# Patient Record
(demographics unavailable — no encounter records)

---

## 2024-11-05 NOTE — IMAGING
[de-identified] :  LEFT KNEE Inspection:  mild effusion Palpation: medial joint line tenderness  Knee Range of Motion:  0-130  Strength: 5/5 Quadriceps strength, 5/5 Hamstring strength Neurological: light touch is intact throughout Ligament Stability and Special Tests:  McMurrays: Positive Lachman: neg Pivot Shift: neg Posterior Drawer: neg Valgus: neg Varus: neg Patella Apprehension: neg Patella Maltracking: neg

## 2024-11-05 NOTE — HISTORY OF PRESENT ILLNESS
[de-identified] : 33 year old male  (, [employer]  )   Left knee pain since 10/20/24 while playing [employer] and felt a 'pop' at knee when it tiwsted The pain is located  medial The pain is associated with  clickng, catching, locking Worse with activity and better at rest. Has tried act mod.  icing, Advil

## 2024-11-05 NOTE — ASSESSMENT
[FreeTextEntry1] : Left X-Ray Examination of the KNEE (4 views): there are no fractures, subluxations or dislocations.   acute complicated injury with knee internal derangement likely meniscus tear requiring surgery   Due to the patients mechanical symptoms along with medial joint line pain, effusion, and pos jelena test on exam we will get an mri to eval for medial meniscus tear   - We discussed their diagnosis and treatment options at length including the risks and benefits of both surgical and non-surgical options. Surgical risks include but are not limited to pain, infection, bleeding, vascular injury, numbness, tingling, nerve damage. - The patient was advised to apply ice (wrapped in a towel or protective covering) to the area daily (20 minutes at a time, 2-4X/day). - naprosyn rx - Patient was given a prescription for an anti-inflammatory medication.  They will take it for the next week and then on an as needed basis, as long as there are no medical contra-indications.  Patient is counseled on possible GI, renal, and cardiovascular side effects. - The patient was advised to modify their activities. - f/u after mri to further discuss surgery            
98.8

## 2024-11-14 NOTE — IMAGING
[de-identified] :  LEFT KNEE Inspection:  mild effusion Palpation: medial joint line tenderness  Knee Range of Motion:  0-130  Strength: 5/5 Quadriceps strength, 5/5 Hamstring strength Neurological: light touch is intact throughout Ligament Stability and Special Tests:  McMurrays: Positive Lachman: neg Pivot Shift: neg Posterior Drawer: neg Valgus: neg Varus: neg Patella Apprehension: neg Patella Maltracking: neg

## 2024-11-14 NOTE — DISCUSSION/SUMMARY
[de-identified] : The patient was advised of the diagnosis.  The natural history of the pathology was explained in full to the patient in layman's terms.  Several different treatment options were discussed and explained to the patient including the risks and benefits of both surgical and non-surgical treatments.  The risks, benefits, and alternatives to surgical arthroscopy of the left knee with medial meniscus repair vs. partial meniscectomy, synovectomy, and cyst excision were reviewed with the patient.  Specifically, I reviewed with the patient that any anterior knee pain may paradoxically worsen for the first six weeks following arthroscopy due to quadriceps weakness. Further, I reviewed with the patient that while arthroscopic treatment typically provides substantial relief of the symptoms (posteromedial or posterolateral joint line pain and mechanical symptoms) related to meniscus tears, arthroscopic treatments typically have very minimal relief of symptoms (anterior knee pain) related to chondromalacia patella or early osteoarthritis.  The risk of recurrent tears as well as progression of occult or underlying arthritis, avascular necrosis, and / or chondrolysis were discussed as well. The patient clearly communicated that these issues were understood.    We also discussed that the risks of surgery include but are not limited to pain, infection (superficial or deep), bleeding, vascular injury, numbness, tingling, nerve damage (direct or indirect), scarring, wound breakdown, failure to resolve symptoms, symptom recurrence, the need for further surgery as well as medical complications such as blood clots, pulmonary embolism, heart attack, stroke, and other anesthesia complications including even death.  The patient clearly communicated that these risks were understood and wished to proceed. This will be scheduled accordingly.

## 2024-11-14 NOTE — HISTORY OF PRESENT ILLNESS
[de-identified] : 33 year old male  (, ultimate jo[employer]  )   Left knee pain since 10/20/24 while playing [employer] and felt a 'pop' at knee when it tiwsted The pain is located  medial The pain is associated with  clickng, catching, locking Worse with activity and better at rest. Has tried act mod.  icing, Advil  11/14/24 - had mri showing MMT, cont pain and [id number], using naproyn,

## 2024-11-14 NOTE — ASSESSMENT
[FreeTextEntry1] :  mri left knee ZP 11/12/24 - MMT (undersurface and some radil component), synov, med parameniscal cyst 1cm   - We discussed their diagnosis and treatment options at length including the risks and benefits of both surgical and non-surgical options. Surgical risks include but are not limited to pain, infection, bleeding, vascular injury, numbness, tingling, nerve damage. - Given their active lifestyle along with pain and mechanical symptoms they are a surgical candidate. - The risks, benefits, and alternatives to left knee MMR vs PMM, synov, cyst excision were discussed with the patient, all questions were answered.

## 2024-11-14 NOTE — IMAGING
[de-identified] :  LEFT KNEE Inspection:  mild effusion Palpation: medial joint line tenderness  Knee Range of Motion:  0-130  Strength: 5/5 Quadriceps strength, 5/5 Hamstring strength Neurological: light touch is intact throughout Ligament Stability and Special Tests:  McMurrays: Positive Lachman: neg Pivot Shift: neg Posterior Drawer: neg Valgus: neg Varus: neg Patella Apprehension: neg Patella Maltracking: neg

## 2024-11-14 NOTE — DISCUSSION/SUMMARY
[de-identified] : The patient was advised of the diagnosis.  The natural history of the pathology was explained in full to the patient in layman's terms.  Several different treatment options were discussed and explained to the patient including the risks and benefits of both surgical and non-surgical treatments.  The risks, benefits, and alternatives to surgical arthroscopy of the left knee with medial meniscus repair vs. partial meniscectomy, synovectomy, and cyst excision were reviewed with the patient.  Specifically, I reviewed with the patient that any anterior knee pain may paradoxically worsen for the first six weeks following arthroscopy due to quadriceps weakness. Further, I reviewed with the patient that while arthroscopic treatment typically provides substantial relief of the symptoms (posteromedial or posterolateral joint line pain and mechanical symptoms) related to meniscus tears, arthroscopic treatments typically have very minimal relief of symptoms (anterior knee pain) related to chondromalacia patella or early osteoarthritis.  The risk of recurrent tears as well as progression of occult or underlying arthritis, avascular necrosis, and / or chondrolysis were discussed as well. The patient clearly communicated that these issues were understood.    We also discussed that the risks of surgery include but are not limited to pain, infection (superficial or deep), bleeding, vascular injury, numbness, tingling, nerve damage (direct or indirect), scarring, wound breakdown, failure to resolve symptoms, symptom recurrence, the need for further surgery as well as medical complications such as blood clots, pulmonary embolism, heart attack, stroke, and other anesthesia complications including even death.  The patient clearly communicated that these risks were understood and wished to proceed. This will be scheduled accordingly.

## 2024-11-14 NOTE — HISTORY OF PRESENT ILLNESS
[de-identified] : 33 year old male  (, [employer]  )   Left knee pain since 10/20/24 while playing [employer] and felt a 'pop' at knee when it tiwsted The pain is located  medial The pain is associated with  clickng, catching, locking Worse with activity and better at rest. Has tried act mod.  icing, Advil  11/14/24 - had mri showing MMT, cont pain and [id number], using naproyn,

## 2024-11-26 NOTE — IMAGING
[de-identified] :   LEFT KNEE Inspection:  incisions c/d/i,  mod effusion  Palpation: no ttp Knee Range of Motion:  0-60 degrees Strength: 4/5 Quadriceps strength, 4+/5 Hamstring strength Neurological: light touch is intact throughout Ligament Stability and Special Tests:  McMurrays: neg Lachman: neg Pivot Shift: neg Posterior Drawer: neg Valgus: neg Varus: neg Patella Apprehension: neg Patella Maltracking: neg

## 2024-11-26 NOTE — IMAGING
[de-identified] :   LEFT KNEE Inspection:  incisions c/d/i,  mod effusion  Palpation: no ttp Knee Range of Motion:  0-60 degrees Strength: 4/5 Quadriceps strength, 4+/5 Hamstring strength Neurological: light touch is intact throughout Ligament Stability and Special Tests:  McMurrays: neg Lachman: neg Pivot Shift: neg Posterior Drawer: neg Valgus: neg Varus: neg Patella Apprehension: neg Patella Maltracking: neg

## 2024-11-26 NOTE — HISTORY OF PRESENT ILLNESS
[de-identified] : 33 year old male  (, [employer]  )   Left knee pain since 10/20/24 while playing [employer] and felt a 'pop' at knee when it tiwsted The pain is located  medial The pain is associated with  clickng, catching, locking Worse with activity and better at rest. Has tried act mod.  icing, Advil  11/14/24 - had mri showing MMT, cont pain and [id number], using naproyn,   *** s/p L MMR, synov, cyst on 11/22/24 **  11/26/24 - po visit, using brace, starting PT in [facility] with Marcelino

## 2024-11-26 NOTE — HISTORY OF PRESENT ILLNESS
[de-identified] : 33 year old male  (, [employer]  )   Left knee pain since 10/20/24 while playing [employer] and felt a 'pop' at knee when it tiwsted The pain is located  medial The pain is associated with  clickng, catching, locking Worse with activity and better at rest. Has tried act mod.  icing, Advil  11/14/24 - had mri showing MMT, cont pain and [id number], using naproyn,   *** s/p L MMR, synov, cyst on 11/22/24 **  11/26/24 - po visit, using brace, starting PT in [facility] with Marcelino

## 2024-11-26 NOTE — ASSESSMENT
[FreeTextEntry1] :  s/p L MMR, synov, cyst on 11/22/24  - PT on post op protocol - The patient was provided with a prescription for Physical Therapy  - Home exercises program learned at physical therapy. - The patient was advised to apply ice (wrapped in a towel or protective covering) to the area daily (20 minutes at a time, 2-4X/day). - fu 8 week re-eval

## 2025-01-21 NOTE — IMAGING
[de-identified] :  / LEFT KNEE Inspection:  well healed surg scars, mild effusion Palpation: no ttp Knee Range of Motion:  0-130 Strength: 5-/5 Quadriceps strength, 5/5 Hamstring strength Neurological: light touch is intact throughout Ligament Stability and Special Tests:  McMurrays: neg Lachman: neg Pivot Shift: neg Posterior Drawer: neg Valgus: neg Varus: neg Patella Apprehension: neg Patella Maltracking: neg

## 2025-01-21 NOTE — ASSESSMENT
[FreeTextEntry1] :  s/p L MMR, synov, cyst on 11/22/24   - The patient was advised of the diagnosis.  The natural history of the pathology was explained to the patient in layman's terms.  Several different treatment options were discussed and explained including the risks and benefits.   - They will continue conservative treatment with PT, icing, and anti-inflammatory medications. - cont PT on post op protocol - The patient was provided with a prescription for Physical Therapy  - Home exercises program learned at physical therapy. - fu 6-8 week re-eval

## 2025-01-21 NOTE — HISTORY OF PRESENT ILLNESS
[de-identified] : 33 year old male  (, [employer]  )   Left knee pain since 10/20/24 while playing [employer] and felt a 'pop' at knee when it tiwsted The pain is located  medial The pain is associated with  clickng, catching, locking Worse with activity and better at rest. Has tried act mod.  icing, Advil  11/14/24 - had mri showing MMT, cont pain and [id number], using naproyn,   *** s/p L MMR, synov, cyst on 11/22/24 **  11/26/24 - po visit, using brace, starting PT in [facility] with Marcelino 1/21/25 - cont with PT and HEP on protocol

## 2025-02-18 NOTE — IMAGING
[de-identified] :  LEFT KNEE Inspection:  well healed surg scars, mod effusion, pop cyst Palpation: medial facet a`nd medial joint line  ttp Knee Range of Motion:  0-120 Strength: 5-/5 Quadriceps strength, 5/5 Hamstring strength Neurological: light touch is intact throughout Ligament Stability and Special Tests:  McMurrays: neg Lachman: neg Pivot Shift: neg Posterior Drawer: neg Valgus: neg Varus: neg Patella Apprehension: neg Patella Maltracking: neg

## 2025-02-18 NOTE — HISTORY OF PRESENT ILLNESS
[de-identified] : 33 year old male  (, ultimate [employer]  )   Left knee pain since 10/20/24 while playing [employer] and felt a 'pop' at knee when it tiwsted The pain is located  medial The pain is associated with  clickng, catching, locking Worse with activity and better at rest. Has tried act mod.  icing, Advil  11/14/24 - had mri showing MMT, cont pain and [id number], using naproyn,   *** s/p L MMR, synov, cyst on 11/22/24 **  11/26/24 - po visit, using brace, starting PT in [facility] with Marcelino 1/21/25 - cont with PT and HEP  2/18/25 - was advancing activity with split suqats at PT and walking in snow and ice outside, now worsening swelling, has been icing a`nd3 mod activtiy

## 2025-02-18 NOTE — ASSESSMENT
[FreeTextEntry1] :  s/p L MMR, synov, cyst on 11/22/24   - The patient was advised of the diagnosis.  The natural history of the pathology was explained to the patient in layman's terms.  Several different treatment options were discussed and explained including the risks and benefits.   - They will continue conservative treatment with PT, icing, and anti-inflammatory medications. - cont PT on post op protocol - The patient was provided with a prescription for Physical Therapy  - Home exercises program learned at physical therapy. - asp L knee, grzegorz well - MDP rx - Discussed possible side effects of medication along with timing and frequency for taking - fu 6-8 week re-eval, if not better new mri to eval any recurrent tearing

## 2025-03-11 NOTE — HISTORY OF PRESENT ILLNESS
[de-identified] : 33 year old male  (, ultimate [employer]  )   Left knee pain since 10/20/24 while playing [employer] and felt a 'pop' at knee when it tiwsted The pain is located  medial The pain is associated with  clickng, catching, locking Worse with activity and better at rest. Has tried act mod.  icing, Advil  11/14/24 - had mri showing MMT, cont pain and [id number], using naproyn,   *** s/p L MMR, synov, cyst on 11/22/24 **  11/26/24 - po visit, using brace, [facility] with Marcelino 1/21/25 - cont with PT and HEP  2/18/25 - was advancing activity with split suqats at PT and walking in snow and ice outside, now worsening swelling, has been icing a`nd3 mod activtiy 3/11/25 - Aspiration last visit 2/18/25 with some relief. Took MDP with some relief. Cont PT and HEP. Cont knee pain, swelling, buckling cont now

## 2025-03-11 NOTE — ASSESSMENT
[FreeTextEntry1] :  s/p L MMR, synov, cyst on 11/22/24   - The patient was advised of the diagnosis.  The natural history of the pathology was explained to the patient in layman's terms.  Several different treatment options were discussed and explained including the risks and benefits.   - They will continue conservative treatment with PT, icing, and anti-inflammatory medications. - cont PT on post op protocol - The patient was provided with a prescription for Physical Therapy  - Home exercises program learned at physical therapy. - mobic rx - Patient was given a prescription for an anti-inflammatory medication.  They will take it for the next week and then on an as needed basis, as long as there are no medical contra-indications.  Patient is counseled on possible GI, renal, and cardiovascular side effects. - new mri to eval any recurrent tearing - fu after mri

## 2025-03-11 NOTE — IMAGING
[de-identified] :  LEFT KNEE Inspection:  well healed surg scars, mod effusion, pop cyst Palpation: medial facet and medial joint line  ttp Knee Range of Motion:  0-120 Strength: 5-/5 Quadriceps strength, 5/5 Hamstring strength Neurological: light touch is intact throughout Ligament Stability and Special Tests:  McMurrays: neg Lachman: neg Pivot Shift: neg Posterior Drawer: neg Valgus: neg Varus: neg Patella Apprehension: neg Patella Maltracking: neg

## 2025-03-13 NOTE — ASSESSMENT
[FreeTextEntry1] :  s/p L MMR, synov, cyst on 11/22/24  mri left knee 3/11/25 - recurrent MMT, synov, mcl sprian, eff  - We discussed their diagnosis and treatment options at length including the risks and benefits of both surgical and non-surgical options. - Given their active lifestyle along with continued pain and mechanical symptoms despite conservative treatment they are a surgical candidate. - The risks, benefits, and alternatives to  left knee PMM vs repair were discussed with the patient, all questions were answered.

## 2025-03-13 NOTE — DISCUSSION/SUMMARY
[de-identified] : The patient was advised of the diagnosis.  The natural history of the pathology was explained in full to the patient in layman's terms.  Several different treatment options were discussed and explained to the patient including the risks and benefits of both surgical and non-surgical treatments.  The risks, benefits, and alternatives to surgical arthroscopy of the left knee with partial medial meniscectomy vs reapir were reviewed with the patient.  Specifically, I reviewed with the patient that any anterior knee pain may paradoxically worsen for the first six weeks following arthroscopy due to quadriceps weakness. Further, I reviewed with the patient that while arthroscopic treatment typically provides substantial relief of the symptoms (posteromedial or posterolateral joint line pain and mechanical symptoms) related to meniscus tears, arthroscopic treatments typically have very minimal relief of symptoms (anterior knee pain) related to chondromalacia patella or early osteoarthritis.  The risk of recurrent tears as well as progression of occult or underlying arthritis, avascular necrosis, and / or chondrolysis were discussed as well. The patient clearly communicated that these issues were understood.    We also discussed that the risks of surgery include but are not limited to pain, infection (superficial or deep), bleeding, vascular injury, numbness, tingling, nerve damage (direct or indirect), scarring, wound breakdown, failure to resolve symptoms, symptom recurrence, the need for further surgery as well as medical complications such as blood clots, pulmonary embolism, heart attack, stroke, and other anesthesia complications including even death.  The patient clearly communicated that these risks were understood and wished to proceed. This will be scheduled accordingly.

## 2025-03-13 NOTE — HISTORY OF PRESENT ILLNESS
[de-identified] : 33 year old male  (, ultimate jo[employer]  )   Left knee pain since 10/20/24 while playing [employer] and felt a 'pop' at knee when it tiwsted The pain is located  medial The pain is associated with  clickng, catching, locking Worse with activity and better at rest. Has tried act mod.  icing, Advil  11/14/24 - had mri showing MMT, cont pain and [id number], using naproyn,   *** s/p L MMR, synov, cyst on 11/22/24 **  11/26/24 - po visit, using brace, starting PT in [facility] with Marcelino 1/21/25 - cont with PT and HEP  2/18/25 - was advancing activity with split suqats at PT and walking in snow and ice outside, now worsening swelling, has been icing a`nd3 mod activtiy 3/11/25 - Aspiration last visit 2/18/25 with some relief. Took MDP with some relief. Cont PT and HEP. Cont knee pain, swelling, buckling cont now 3/13/25 - had mri, cont medial pain and catching and buckling

## 2025-03-13 NOTE — IMAGING
[de-identified] :  LEFT KNEE Inspection:  well healed surg scars, mod effusion, pop cyst Palpation: medial joint line  ttp Knee Range of Motion:  0-120 Strength: 5-/5 Quadriceps strength, 5/5 Hamstring strength Neurological: light touch is intact throughout Ligament Stability and Special Tests:  McMurrays: neg Lachman: neg Pivot Shift: neg Posterior Drawer: neg Valgus: neg Varus: neg Patella Apprehension: neg Patella Maltracking: neg

## 2025-03-13 NOTE — DISCUSSION/SUMMARY
[de-identified] : The patient was advised of the diagnosis.  The natural history of the pathology was explained in full to the patient in layman's terms.  Several different treatment options were discussed and explained to the patient including the risks and benefits of both surgical and non-surgical treatments.  The risks, benefits, and alternatives to surgical arthroscopy of the left knee with partial medial meniscectomy vs reapir were reviewed with the patient.  Specifically, I reviewed with the patient that any anterior knee pain may paradoxically worsen for the first six weeks following arthroscopy due to quadriceps weakness. Further, I reviewed with the patient that while arthroscopic treatment typically provides substantial relief of the symptoms (posteromedial or posterolateral joint line pain and mechanical symptoms) related to meniscus tears, arthroscopic treatments typically have very minimal relief of symptoms (anterior knee pain) related to chondromalacia patella or early osteoarthritis.  The risk of recurrent tears as well as progression of occult or underlying arthritis, avascular necrosis, and / or chondrolysis were discussed as well. The patient clearly communicated that these issues were understood.    We also discussed that the risks of surgery include but are not limited to pain, infection (superficial or deep), bleeding, vascular injury, numbness, tingling, nerve damage (direct or indirect), scarring, wound breakdown, failure to resolve symptoms, symptom recurrence, the need for further surgery as well as medical complications such as blood clots, pulmonary embolism, heart attack, stroke, and other anesthesia complications including even death.  The patient clearly communicated that these risks were understood and wished to proceed. This will be scheduled accordingly.

## 2025-03-13 NOTE — HISTORY OF PRESENT ILLNESS
[de-identified] : 33 year old male  (, ultimate [employer]  )   Left knee pain since 10/20/24 while playing [employer] and felt a 'pop' at knee when it tiwsted The pain is located  medial The pain is associated with  clickng, catching, locking Worse with activity and better at rest. Has tried act mod.  icing, Advil  11/14/24 - had mri showing MMT, cont pain and [id number], using naproyn,   *** s/p L MMR, synov, cyst on 11/22/24 **  11/26/24 - po visit, using brace, [facility] 1/21/25 - cont with PT and HEP  2/18/25 - was advancing activity with split suqats at PT and walking in snow and ice outside, now worsening swelling, has been icing a`nd3 mod activtiy 3/11/25 - Aspiration last visit 2/18/25 with some relief. Took MDP with some relief. Cont PT and HEP. Cont knee pain, swelling, buckling cont now 3/13/25 - had mri, cont medial pain and catching and buckling

## 2025-03-13 NOTE — IMAGING
[de-identified] :  LEFT KNEE Inspection:  well healed surg scars, mod effusion, pop cyst Palpation: medial joint line  ttp Knee Range of Motion:  0-120 Strength: 5-/5 Quadriceps strength, 5/5 Hamstring strength Neurological: light touch is intact throughout Ligament Stability and Special Tests:  McMurrays: neg Lachman: neg Pivot Shift: neg Posterior Drawer: neg Valgus: neg Varus: neg Patella Apprehension: neg Patella Maltracking: neg

## 2025-03-25 NOTE — HISTORY OF PRESENT ILLNESS
[de-identified] : 33 year old male  (, ultimate jo[employer]  )   Left knee pain since 10/20/24 while playing [employer] and felt a 'pop' at knee when it tiwsted The pain is located  medial The pain is associated with  clickng, catching, locking Worse with activity and better at rest. Has tried act mod.  icing, Advil  11/14/24 - had mri showing MMT, cont pain and [id number], using naproyn,   *** s/p L MMR, synov, cyst on 11/22/24 **  11/26/24 - po visit, using brace, [facility] - cont with PT and HEP  2/18/25 - was advancing activity with split suqats at PT and walking in snow and ice outside, now worsening swelling, has been icing a`nd3 mod activtiy 3/11/25 - Aspiration last visit 2/18/25 with some relief. Took MDP with some relief. Cont PT and HEP. Cont knee pain, swelling, buckling cont now 3/13/25 - had mri, cont medial pain and catching and buckling  ***s/p L knee PMM, synov on 3/19/25***  3/25/25 - po visit, back in PT in [facility], pain well controlled

## 2025-03-25 NOTE — IMAGING
[de-identified] :  LEFT KNEE Inspection:  mild effusion, incisions c/d/i Palpation: medial facet ttp Knee Range of Motion:  0-120 Strength: 4/5 Quadriceps strength, 5/5 Hamstring strength Neurological: light touch is intact throughout Ligament Stability and Special Tests:  McMurrays: neg Lachman: neg Pivot Shift: neg Posterior Drawer: neg Valgus: neg Varus: neg Patella Apprehension: neg Patella Maltracking: neg No

## 2025-03-25 NOTE — ASSESSMENT
[FreeTextEntry1] : s/p L knee PMM, synov on 3/19/25  - PT on post op protocol - The patient was provided with a prescription for Physical Therapy  - Home exercises program learned at physical therapy. - The patient was advised to apply ice (wrapped in a towel or protective covering) to the area daily (20 minutes at a time, 2-4X/day). - fu 8 week re-eval

## 2025-03-25 NOTE — HISTORY OF PRESENT ILLNESS
[de-identified] : 33 year old male  (, ultimate [employer]  )   Left knee pain since 10/20/24 while playing [employer] and felt a 'pop' at knee when it tiwsted The pain is located  medial The pain is associated with  clickng, catching, locking Worse with activity and better at rest. Has tried act mod.  icing, Advil  11/14/24 - had mri showing MMT, cont pain and [id number], using naproyn,   *** s/p L MMR, synov, cyst on 11/22/24 **  11/26/24 - po visit, using brace, [facility] - cont with PT and HEP  2/18/25 - was advancing activity with split suqats at PT and walking in snow and ice outside, now worsening swelling, has been icing a`nd3 mod activtiy 3/11/25 - Aspiration last visit 2/18/25 with some relief. Took MDP with some relief. Cont PT and HEP. Cont knee pain, swelling, buckling cont now 3/13/25 - had mri, cont medial pain and catching and buckling  ***s/p L knee PMM, synov on 3/19/25***  3/25/25 - po visit, back in PT in [facility], pain well controlled

## 2025-03-25 NOTE — IMAGING
[de-identified] :  LEFT KNEE Inspection:  mild effusion, incisions c/d/i Palpation: medial facet ttp Knee Range of Motion:  0-120 Strength: 4/5 Quadriceps strength, 5/5 Hamstring strength Neurological: light touch is intact throughout Ligament Stability and Special Tests:  McMurrays: neg Lachman: neg Pivot Shift: neg Posterior Drawer: neg Valgus: neg Varus: neg Patella Apprehension: neg Patella Maltracking: neg

## 2025-05-20 NOTE — IMAGING
[de-identified] :   LEFT KNEE Inspection:  well healed surg scars, mild effusion Palpation: no ttp Knee Range of Motion:  0-135 Strength: 5-/5 Quadriceps strength, 5/5 Hamstring strength Neurological: light touch is intact throughout Ligament Stability and Special Tests:  McMurrays: neg Lachman: neg Pivot Shift: neg Posterior Drawer: neg Valgus: neg Varus: neg Patella Apprehension: neg Patella Maltracking: neg

## 2025-05-20 NOTE — HISTORY OF PRESENT ILLNESS
[de-identified] : 33 year old male  (, ultimate jo[employer]  )   Left knee pain since 10/20/24 while playing [employer] and felt a 'pop' at knee when it tiwsted The pain is located  medial The pain is associated with  clickng, catching, locking Worse with activity and better at rest. Has tried act mod.  icing, Advil  11/14/24 - had mri showing MMT, cont pain and [id number], using naproyn,   *** s/p L MMR, synov, cyst on 11/22/24 **  11/26/24 - po visit, using brace, starting PT in [facility] with Marcelino 1/21/25 - cont with PT and HEP  2/18/25 - was advancing activity with split suqats at PT and walking in snow and ice outside, now worsening swelling, has been icing a`nd3 mod activtiy 3/11/25 - Aspiration last visit 2/18/25 with some relief. Took MDP with some relief. Cont PT and HEP. Cont knee pain, swelling, buckling cont now 3/13/25 - had mri, cont medial pain and catching and buckling  ***s/p L knee PMM, synov on 3/19/25***  3/25/25 - po visit, back in PT in [facility], pain well controlled 5/22/25 - cont with PT and HEP, improving

## 2025-05-20 NOTE — ASSESSMENT
[FreeTextEntry1] : s/p L knee PMM, synov on 3/19/25   - The patient was advised of the diagnosis.  The natural history of the pathology was explained to the patient in layman's terms.  Several different treatment options were discussed and explained including the risks and benefits.   - They will continue conservative treatment with PT, icing, and anti-inflammatory medications. - cont PT on post op protocol - The patient was provided with a prescription for Physical Therapy  - Home exercises program learned at physical therapy. - fu 6-8 week re-eval as needed